# Patient Record
(demographics unavailable — no encounter records)

---

## 2024-11-27 NOTE — DISCUSSION/SUMMARY
[de-identified] : 60-year-old woman s/p ORIF left shoulder in [place], approximately 9 days out.  -X-ray and physical exam findings were discussed with the patient -REENA left UE -Physical therapy: as per protocol -Follow up in 1 week to remove staples  -All the patient's questions and concerns were addressed during this visit

## 2024-11-27 NOTE — DISCUSSION/SUMMARY
[de-identified] : 60-year-old woman s/p ORIF left shoulder in [place], approximately 9 days out.  -X-ray and physical exam findings were discussed with the patient -REENA left UE -Physical therapy: as per protocol -Follow up in 1 week to remove staples  -All the patient's questions and concerns were addressed during this visit

## 2024-11-27 NOTE — HISTORY OF PRESENT ILLNESS
[de-identified] : Ms. HENRY GTZ is a 60 year old woman presents today for initial evaluation of a left shoulder injury sustained on 11/14/24 after a fall in [place]. 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 Her  states that she fell while walking to dinner.  The patient's  contributes to the history of present illness.  She notes mild numbness to the palm and fingers.

## 2024-11-27 NOTE — HISTORY OF PRESENT ILLNESS
[de-identified] : Ms. HENRY GTZ is a 60 year old woman presents today for initial evaluation of a left shoulder injury sustained on 11/14/24 after a fall in [place]. 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 Her  states that she fell while walking to dinner.  The patient's  contributes to the history of present illness.  She notes mild numbness to the palm and fingers.

## 2024-11-27 NOTE — PHYSICAL EXAM
[de-identified] : Ms. HENRY GTZ is sitting comfortably in the exam room  LEFT shoulder -Skin is intact, no swelling, ecchymosis -Incision is clean and dry, no erythema, no signs of infection, staples in place -Passive FE: 30 , ER: neutral , IR: Hip ,Passive ABD: 30 -Able to make a fist -Sensation is grossly intact, but slightly decreased.  Intact median, radial, ulnar, axillary nerves -Motor is intact median, radial, ulnar, axillary nerves -Hand is warm and well-perfused, Palpable radial and ulnar pulses  [de-identified] :  X-rays of the left shoulder were taken in the office today, 11/27/24. AP, Scap Y, Axillary.  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  Implants are in good position.  There are staples in the x-rays.

## 2024-11-27 NOTE — PHYSICAL EXAM
[de-identified] : Ms. HENRY GTZ is sitting comfortably in the exam room  LEFT shoulder -Skin is intact, no swelling, ecchymosis -Incision is clean and dry, no erythema, no signs of infection, staples in place -Passive FE: 30 , ER: neutral , IR: Hip ,Passive ABD: 30 -Able to make a fist -Sensation is grossly intact, but slightly decreased.  Intact median, radial, ulnar, axillary nerves -Motor is intact median, radial, ulnar, axillary nerves -Hand is warm and well-perfused, Palpable radial and ulnar pulses  [de-identified] :  X-rays of the left shoulder were taken in the office today, 11/27/24. AP, Scap Y, Axillary.  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  Implants are in good position.  There are staples in the x-rays.

## 2024-12-05 NOTE — DISCUSSION/SUMMARY
[de-identified] : 60-year-old woman s/p ORIF left shoulder in [place], approximately 2.5 weeks out.  -X-ray and physical exam findings were discussed with the patient -NWB left UE -Staples removed today -Physical therapy: as per protocol -Pendulum exercises were demonstrated in the office today -Follow up in 4 weeks with x-rays of the left shoulder at that time.  -All the patient's questions and concerns were addressed during this visit

## 2024-12-05 NOTE — PHYSICAL EXAM
[de-identified] : Ms. HENRY GTZ is sitting comfortably in the exam room  LEFT shoulder -Skin is intact, no swelling, ecchymosis -Incision is clean and dry, no erythema, no signs of infection -Passive FE: 30 , ER: neutral , IR: Hip ,Passive ABD: 30 -Able to make a fist -Sensation is grossly intact, but slightly decreased.  Intact median, radial, ulnar, axillary nerves -Motor is intact median, radial, ulnar, axillary nerves -Hand is warm and well-perfused, Palpable radial and ulnar pulses  [de-identified] : No Xrays were taken in the office today, 12/5/24

## 2024-12-05 NOTE — HISTORY OF PRESENT ILLNESS
[de-identified] : Ms. HENRY GTZ is a 60 year old woman presents today for follow up evaluation of s/p left reverse total shoulder arthroplasty in [place] on 11/18/24. Since her last visit she states she is feeling the same. She still notes some pain at the left shoulder. She is here for staple removal

## 2024-12-05 NOTE — PHYSICAL EXAM
[de-identified] : Ms. HENRY GTZ is sitting comfortably in the exam room  LEFT shoulder -Skin is intact, no swelling, ecchymosis -Incision is clean and dry, no erythema, no signs of infection -Passive FE: 30 , ER: neutral , IR: Hip ,Passive ABD: 30 -Able to make a fist -Sensation is grossly intact, but slightly decreased.  Intact median, radial, ulnar, axillary nerves -Motor is intact median, radial, ulnar, axillary nerves -Hand is warm and well-perfused, Palpable radial and ulnar pulses  [de-identified] : No Xrays were taken in the office today, 12/5/24

## 2024-12-05 NOTE — HISTORY OF PRESENT ILLNESS
[de-identified] : Ms. HENRY GTZ is a 60 year old woman presents today for follow up evaluation of s/p left reverse total shoulder arthroplasty in [place] on 11/18/24. Since her last visit she states she is feeling the same. She still notes some pain at the left shoulder. She is here for staple removal

## 2024-12-05 NOTE — DISCUSSION/SUMMARY
[de-identified] : 60-year-old woman s/p ORIF left shoulder in [place], approximately 2.5 weeks out.  -X-ray and physical exam findings were discussed with the patient -NWB left UE -Staples removed today -Physical therapy: as per protocol -Pendulum exercises were demonstrated in the office today -Follow up in 4 weeks with x-rays of the left shoulder at that time.  -All the patient's questions and concerns were addressed during this visit

## 2024-12-31 NOTE — PHYSICAL EXAM
[de-identified] : Ms. HENRY GTZ is sitting comfortably in the exam room  LEFT shoulder -Skin is intact, no swelling, ecchymosis -Incision is well-healed, no erythema, no signs of infection -Passive FE: 30 , ER: neutral , IR: Hip ,Passive ABD: 30 -Able to make a fist -Sensation is grossly intact, but slightly decreased.  Intact median, radial, ulnar, axillary nerves -Motor is intact median, radial, ulnar, axillary nerves -Hand is warm and well-perfused, Palpable radial and ulnar pulses  [de-identified] :  X-rays of the left shoulder were taken in the office today, 1/2/25. AP, Scap Y, Axillary. 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 Implants are in good position. There are staples in the x-rays.

## 2024-12-31 NOTE — DISCUSSION/SUMMARY
[de-identified] : 60-year-old woman s/p ORIF left shoulder in [place], approximately 6.5 weeks out.  -X-ray and physical exam findings were discussed with the patient -5lb weight limit left UE -Physical therapy: as per protocol -Follow up in 2 months with x-rays of the left shoulder at that time.  -All the patient's questions and concerns were addressed during this visit

## 2024-12-31 NOTE — HISTORY OF PRESENT ILLNESS
[de-identified] : Ms. HENRY GTZ is a 60 year old woman presents today for follow up evaluation of s/p left reverse total shoulder arthroplasty in [place] on 11/18/24. Since her last visit she states she is feeling

## 2025-03-05 NOTE — HISTORY OF PRESENT ILLNESS
[de-identified] : Ms. HENRY GTZ is a 60 year old woman presents today for follow up evaluation of s/p left reverse total shoulder arthroplasty in [place] on 11/18/24. Since her last visit she states she is feeling better. She is participating in PT for the shoulder and hand therapy 2-3 times a week and is happy with her progress.

## 2025-03-05 NOTE — PHYSICAL EXAM
[de-identified] : Ms. HENRY GTZ is sitting comfortably in the exam room  LEFT shoulder -Skin is intact, no swelling, ecchymosis -Incision is well-healed, no erythema, no signs of infection -FE: 90 , ER: 5 , IR: L5 , ABD: 60 -Able to make a fist -Sensation is grossly intact, but slightly decreased.  Intact median, radial, ulnar, axillary nerves -Motor is intact median, radial, ulnar, axillary nerves -Hand is warm and well-perfused, Palpable radial and ulnar pulses  [de-identified] :  X-rays of the left shoulder were taken in the office today, 3/5/25. AP, Scap Y, Axillary. 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 Implants are in good position. There are staples in the x-rays.

## 2025-03-05 NOTE — DISCUSSION/SUMMARY
[de-identified] : 60-year-old woman s/p left reverse total shoulder arthroplasty in [place], approximately 3.5 months out.  -X-ray and physical exam findings were discussed with the patient -WBAT left UE -Physical therapy: as per protocol -Hand therapy -Follow up in 3 months with x-rays of the left shoulder at that time.  -All the patient's questions and concerns were addressed during this visit

## 2025-03-05 NOTE — REASON FOR VISIT
[Follow-Up Visit] : a follow-up visit for [FreeTextEntry2] : s/p left reverse total shoulder arthroplasty in [place]

## 2025-03-05 NOTE — DISCUSSION/SUMMARY
[de-identified] : 60-year-old woman s/p left reverse total shoulder arthroplasty in [place], approximately 3.5 months out.  -X-ray and physical exam findings were discussed with the patient -WBAT left UE -Physical therapy: as per protocol -Hand therapy -Follow up in 3 months with x-rays of the left shoulder at that time.  -All the patient's questions and concerns were addressed during this visit

## 2025-03-05 NOTE — PHYSICAL EXAM
[de-identified] : Ms. HENRY GTZ is sitting comfortably in the exam room  LEFT shoulder -Skin is intact, no swelling, ecchymosis -Incision is well-healed, no erythema, no signs of infection -FE: 90 , ER: 5 , IR: L5 , ABD: 60 -Able to make a fist -Sensation is grossly intact, but slightly decreased.  Intact median, radial, ulnar, axillary nerves -Motor is intact median, radial, ulnar, axillary nerves -Hand is warm and well-perfused, Palpable radial and ulnar pulses  [de-identified] :  X-rays of the left shoulder were taken in the office today, 3/5/25. AP, Scap Y, Axillary. 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 Implants are in good position. There are staples in the x-rays.

## 2025-06-10 NOTE — DISCUSSION/SUMMARY
[de-identified] : 60-year-old woman s/p left reverse total shoulder arthroplasty in [place], approximately 6.5 months out.  -X-ray and physical exam findings were discussed with the patient -WBAT left UE -Physical therapy: as per protocol -It is medically necessary for the patient to continue PT due to the extent of her injuries.  -Follow up as needed with x-rays of the left shoulder at that time.  -All the patient's questions and concerns were addressed during this visit

## 2025-06-10 NOTE — HISTORY OF PRESENT ILLNESS
[de-identified] : Ms. HENRY GTZ is a 60 year old woman presents today for follow up evaluation of s/p left reverse total shoulder arthroplasty in [place] on 11/18/24. Since her last visit she states she is feeling better. She has been participating in PT for the shoulder and hand. She has completed all the PT that her insurance allows.

## 2025-06-10 NOTE — DISCUSSION/SUMMARY
[de-identified] : 60-year-old woman s/p left reverse total shoulder arthroplasty in [place], approximately 6.5 months out.  -X-ray and physical exam findings were discussed with the patient -WBAT left UE -Physical therapy: as per protocol -It is medically necessary for the patient to continue PT due to the extent of her injuries.  -Follow up as needed with x-rays of the left shoulder at that time.  -All the patient's questions and concerns were addressed during this visit

## 2025-06-10 NOTE — PHYSICAL EXAM
[de-identified] : Ms. HENRY GTZ is sitting comfortably in the exam room  LEFT shoulder -Skin is intact, no swelling, ecchymosis -Incision is well-healed, no erythema, no signs of infection -FE: 110 , ER: 5 , IR: L5 , ABD: 90 -Able to make a fist -Sensation is grossly intact, but slightly decreased.  Intact median, radial, ulnar, axillary nerves -Motor is intact median, radial, ulnar, axillary nerves -Hand is warm and well-perfused, Palpable radial and ulnar pulses  [de-identified] :  X-rays of the left shoulder were taken in the office today, 6/4/25. AP, Scap Y, Axillary. 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 Implants are in good position.  The shoulder is reduced.  No lucencies around the screws in the glenoid.

## 2025-06-10 NOTE — HISTORY OF PRESENT ILLNESS
[de-identified] : Ms. HENRY GTZ is a 60 year old woman presents today for follow up evaluation of s/p left reverse total shoulder arthroplasty in [place] on 11/18/24. Since her last visit she states she is feeling better. She has been participating in PT for the shoulder and hand. She has completed all the PT that her insurance allows.

## 2025-06-10 NOTE — PHYSICAL EXAM
[de-identified] : Ms. HENRY GTZ is sitting comfortably in the exam room  LEFT shoulder -Skin is intact, no swelling, ecchymosis -Incision is well-healed, no erythema, no signs of infection -FE: 110 , ER: 5 , IR: L5 , ABD: 90 -Able to make a fist -Sensation is grossly intact, but slightly decreased.  Intact median, radial, ulnar, axillary nerves -Motor is intact median, radial, ulnar, axillary nerves -Hand is warm and well-perfused, Palpable radial and ulnar pulses  [de-identified] :  X-rays of the left shoulder were taken in the office today, 6/4/25. AP, Scap Y, Axillary. 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 Implants are in good position.  The shoulder is reduced.  No lucencies around the screws in the glenoid.

## 2025-06-10 NOTE — DISCUSSION/SUMMARY
[de-identified] : 60-year-old woman s/p left reverse total shoulder arthroplasty in [place], approximately 6.5 months out.  -X-ray and physical exam findings were discussed with the patient -WBAT left UE -Physical therapy: as per protocol -It is medically necessary for the patient to continue PT due to the extent of her injuries.  -Follow up as needed with x-rays of the left shoulder at that time.  -All the patient's questions and concerns were addressed during this visit

## 2025-06-10 NOTE — HISTORY OF PRESENT ILLNESS
[de-identified] : Ms. HENRY GTZ is a 60 year old woman presents today for follow up evaluation of s/p left reverse total shoulder arthroplasty in [place] on 11/18/24. Since her last visit she states she is feeling better. She has been participating in PT for the shoulder and hand. She has completed all the PT that her insurance allows.

## 2025-06-10 NOTE — PHYSICAL EXAM
[de-identified] : Ms. HENRY GTZ is sitting comfortably in the exam room  LEFT shoulder -Skin is intact, no swelling, ecchymosis -Incision is well-healed, no erythema, no signs of infection -FE: 110 , ER: 5 , IR: L5 , ABD: 90 -Able to make a fist -Sensation is grossly intact, but slightly decreased.  Intact median, radial, ulnar, axillary nerves -Motor is intact median, radial, ulnar, axillary nerves -Hand is warm and well-perfused, Palpable radial and ulnar pulses  [de-identified] :  X-rays of the left shoulder were taken in the office today, 6/4/25. AP, Scap Y, Axillary. 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 Implants are in good position.  The shoulder is reduced.  No lucencies around the screws in the glenoid.